# Patient Record
Sex: MALE | Race: WHITE | URBAN - METROPOLITAN AREA
[De-identification: names, ages, dates, MRNs, and addresses within clinical notes are randomized per-mention and may not be internally consistent; named-entity substitution may affect disease eponyms.]

---

## 2017-10-02 PROBLEM — R35.0 FREQUENCY OF URINATION: Status: ACTIVE | Noted: 2017-10-02

## 2017-10-02 PROBLEM — I86.1 LEFT VARICOCELE: Status: ACTIVE | Noted: 2017-10-02

## 2018-04-23 PROBLEM — R10.32 LEFT GROIN PAIN: Status: ACTIVE | Noted: 2018-04-23

## 2021-08-15 ENCOUNTER — APPOINTMENT (OUTPATIENT)
Dept: CT IMAGING | Facility: HOSPITAL | Age: 26
End: 2021-08-15
Attending: EMERGENCY MEDICINE
Payer: COMMERCIAL

## 2021-08-15 ENCOUNTER — HOSPITAL ENCOUNTER (EMERGENCY)
Facility: HOSPITAL | Age: 26
Discharge: HOME | End: 2021-08-15
Attending: EMERGENCY MEDICINE
Payer: COMMERCIAL

## 2021-08-15 VITALS
TEMPERATURE: 98 F | RESPIRATION RATE: 17 BRPM | HEIGHT: 65 IN | WEIGHT: 170 LBS | BODY MASS INDEX: 28.32 KG/M2 | DIASTOLIC BLOOD PRESSURE: 88 MMHG | HEART RATE: 84 BPM | SYSTOLIC BLOOD PRESSURE: 112 MMHG | OXYGEN SATURATION: 100 %

## 2021-08-15 DIAGNOSIS — R56.9 SEIZURE (HCC): Primary | ICD-10-CM

## 2021-08-15 LAB
ANION GAP SERPL CALC-SCNC: 6 MMOL/L (ref 0–18)
BASOPHILS # BLD AUTO: 0.03 X10(3) UL (ref 0–0.2)
BASOPHILS NFR BLD AUTO: 0.3 %
BUN BLD-MCNC: 12 MG/DL (ref 7–18)
BUN/CREAT SERPL: 12.4 (ref 10–20)
CALCIUM BLD-MCNC: 9.3 MG/DL (ref 8.5–10.1)
CHLORIDE SERPL-SCNC: 106 MMOL/L (ref 98–112)
CO2 SERPL-SCNC: 27 MMOL/L (ref 21–32)
CREAT BLD-MCNC: 0.97 MG/DL
DEPRECATED RDW RBC AUTO: 41.4 FL (ref 35.1–46.3)
EOSINOPHIL # BLD AUTO: 0.13 X10(3) UL (ref 0–0.7)
EOSINOPHIL NFR BLD AUTO: 1.3 %
ERYTHROCYTE [DISTWIDTH] IN BLOOD BY AUTOMATED COUNT: 12.1 % (ref 11–15)
GLUCOSE BLD-MCNC: 103 MG/DL (ref 70–99)
HCT VFR BLD AUTO: 40.3 %
HGB BLD-MCNC: 13.8 G/DL
IMM GRANULOCYTES # BLD AUTO: 0.04 X10(3) UL (ref 0–1)
IMM GRANULOCYTES NFR BLD: 0.4 %
LYMPHOCYTES # BLD AUTO: 1.16 X10(3) UL (ref 1–4)
LYMPHOCYTES NFR BLD AUTO: 11.4 %
MCH RBC QN AUTO: 31.5 PG (ref 26–34)
MCHC RBC AUTO-ENTMCNC: 34.2 G/DL (ref 31–37)
MCV RBC AUTO: 92 FL
MONOCYTES # BLD AUTO: 0.72 X10(3) UL (ref 0.1–1)
MONOCYTES NFR BLD AUTO: 7.1 %
NEUTROPHILS # BLD AUTO: 8.07 X10 (3) UL (ref 1.5–7.7)
NEUTROPHILS # BLD AUTO: 8.07 X10(3) UL (ref 1.5–7.7)
NEUTROPHILS NFR BLD AUTO: 79.5 %
OSMOLALITY SERPL CALC.SUM OF ELEC: 288 MOSM/KG (ref 275–295)
PLATELET # BLD AUTO: 269 10(3)UL (ref 150–450)
POTASSIUM SERPL-SCNC: 5.2 MMOL/L (ref 3.5–5.1)
RBC # BLD AUTO: 4.38 X10(6)UL
SODIUM SERPL-SCNC: 139 MMOL/L (ref 136–145)
TROPONIN I SERPL-MCNC: <0.045 NG/ML (ref ?–0.04)
WBC # BLD AUTO: 10.2 X10(3) UL (ref 4–11)

## 2021-08-15 PROCEDURE — 70450 CT HEAD/BRAIN W/O DYE: CPT | Performed by: EMERGENCY MEDICINE

## 2021-08-15 PROCEDURE — 99203 OFFICE O/P NEW LOW 30 MIN: CPT | Performed by: OTHER

## 2021-08-15 NOTE — ED QUICK NOTES
Allowed er staff to draw blood via straight stick with butterfly. md aware. Remains declining tdap and iv.

## 2021-08-15 NOTE — CONSULTS
[facility]  [address] [facility]  [phone]        [address]    Report of Consultation  Missael Samaniego Patient Status:  Emergency     1995 MRN aware that he was bleeding. His girlfriend endorses that he was \"talking less,\" but the patient denies this. His girlfriend states he was not confused about who she was or where he was but only how he ended up on the ground.     He reports that he is pa     Lack of Transportation (Non-Medical):   Physical Activity:       Days of Exercise per Week:       Minutes of Exercise per Session:   Stress:       Feeling of Stress :   Social Connections:       Frequency of Communication with Friends and Family:     Biceps 5 5  Wrist Extensors 5 5   5 5   Hip Flexors 5 5   Knee extensors 5 5  Knee flexors 5 5  Plantar flexion 5 5  Dorsiflexion 5 5      Pronator drift: No pronator drift   Arm Rolling: No orbiting.    Finger Taps: Finger taps are symmetric in rate an Time: 08/15/21 12:26 PM   Result Value Ref Range    Hold Gold Auto Resulted    Basic Metabolic Panel (8)    Collection Time: 08/15/21 12:26 PM   Result Value Ref Range    Glucose 103 (H) 70 - 99 mg/dL    Sodium 139 136 - 145 mmol/L    Potassium 5.2 (H) 3.5 8/15/2021 at 12:02 PM          EKG    Result Date: 8/15/2021  ECG Report  Interpretation  --------------------------       Performed an independent visualization of head CT  Imaging revealed:   No acute process        William Pérez is a 31year old [facility] done.    Disclaimer: This record was dictated using [address] [place]. There may be errors due to voice recognition problems that were not realized and corrected during the completion of the note.        Total time spent in care of the patient exceeds 35 mi

## 2021-08-15 NOTE — ED PROVIDER NOTES
Patient Seen in: [facility] AND [facility] Emergency Department      History   Patient presents with:  Seizures  Laceration/Abrasion    Stated Complaint: witnessed seizure-like activity, laceration/abrasion behind rt ear    HPI/Subjective:   HPI  58-year-old mal negative except as noted above.     Physical Exam     ED Triage Vitals   BP 08/15/21 1151 112/88   Pulse 08/15/21 1100 69   Resp 08/15/21 1100 18   Temp 08/15/21 1100 97.9 °F (36.6 °C)   Temp src 08/15/21 1100 Oral   SpO2 08/15/21 1100 100 %   O2 Device 08/ diagnosis includes syncope versus seizure versus ACS/MI versus electrolyte derangement, anemia, ICH, skull fracture    ED Course     Labs Reviewed   BASIC METABOLIC PANEL (8) - Abnormal; Notable for the following components:       Result Value    Glucose 1 with Dr. Daniel Yin with neurology who recommends outpatient EEG and MRI. I have informed the patient that he must not drive or go swimming or bathing unattended until he completes the EEG and MRI and is evaluated by the neurologist in clinic.     Return preca

## 2021-08-15 NOTE — ED QUICK NOTES
rec'd care of pt from triage. States he was in the bathroom and fell and hit his head then girlfriend reports that she saw seizure-like activity. She also reports him to be confused but lasted only a couple minutes.  Awake and alert and oriented x4 at this